# Patient Record
Sex: MALE | Race: WHITE | NOT HISPANIC OR LATINO | Employment: FULL TIME | URBAN - NONMETROPOLITAN AREA
[De-identification: names, ages, dates, MRNs, and addresses within clinical notes are randomized per-mention and may not be internally consistent; named-entity substitution may affect disease eponyms.]

---

## 2024-09-06 ENCOUNTER — HOSPITAL ENCOUNTER (EMERGENCY)
Facility: OTHER | Age: 49
Discharge: HOME | End: 2024-09-06
Attending: FAMILY MEDICINE | Admitting: FAMILY MEDICINE
Payer: OTHER GOVERNMENT

## 2024-09-06 VITALS
WEIGHT: 197 LBS | TEMPERATURE: 96.8 F | DIASTOLIC BLOOD PRESSURE: 97 MMHG | OXYGEN SATURATION: 98 % | RESPIRATION RATE: 16 BRPM | BODY MASS INDEX: 28.2 KG/M2 | HEIGHT: 70 IN | HEART RATE: 103 BPM | SYSTOLIC BLOOD PRESSURE: 148 MMHG

## 2024-09-06 DIAGNOSIS — S61.215A LACERATION OF LEFT RING FINGER WITHOUT FOREIGN BODY WITHOUT DAMAGE TO NAIL, INITIAL ENCOUNTER: ICD-10-CM

## 2024-09-06 PROCEDURE — 12002 RPR S/N/AX/GEN/TRNK2.6-7.5CM: CPT | Performed by: FAMILY MEDICINE

## 2024-09-06 PROCEDURE — 99282 EMERGENCY DEPT VISIT SF MDM: CPT | Performed by: FAMILY MEDICINE

## 2024-09-06 PROCEDURE — 99207 PR NO CHARGE LOS: CPT | Performed by: FAMILY MEDICINE

## 2024-09-06 RX ORDER — LIDOCAINE HYDROCHLORIDE 10 MG/ML
5 INJECTION, SOLUTION INFILTRATION; PERINEURAL ONCE
Status: DISCONTINUED | OUTPATIENT
Start: 2024-09-06 | End: 2024-09-06 | Stop reason: HOSPADM

## 2024-09-06 ASSESSMENT — COLUMBIA-SUICIDE SEVERITY RATING SCALE - C-SSRS
1. IN THE PAST MONTH, HAVE YOU WISHED YOU WERE DEAD OR WISHED YOU COULD GO TO SLEEP AND NOT WAKE UP?: NO
6. HAVE YOU EVER DONE ANYTHING, STARTED TO DO ANYTHING, OR PREPARED TO DO ANYTHING TO END YOUR LIFE?: NO
2. HAVE YOU ACTUALLY HAD ANY THOUGHTS OF KILLING YOURSELF IN THE PAST MONTH?: NO

## 2024-09-06 ASSESSMENT — ACTIVITIES OF DAILY LIVING (ADL): ADLS_ACUITY_SCORE: 35

## 2024-09-06 ASSESSMENT — ENCOUNTER SYMPTOMS: WOUND: 1

## 2024-09-06 NOTE — DISCHARGE INSTRUCTIONS
We used sutures to close this wound today.  Here are care instructions for you:    1. Please do not use any topical antibiotic on this. The skin has a balance of bacteria and yeast that is neccesary for skin health and healing, and we do not want to affect this. Please use vaseline and a band-aid on the repair to keep it soft as this heals.    2. Keep the area clean and dry and leave the splint in place for the next 24 hours.  After that it is okay to get the site wet from showering or bathing.  I recommend that you avoid swimming or using a hot tub until the sutures are out.    3. The area around the sutures may start to get red, and that is not a sign of infection in most cases.  The skin is reacting to the sutures and the redness will go away once the sutures are taken out.    4. Plan to return to get the sutures out in 7 days.  You can have this done in a local clinic or you can do this at home.    Skin takes a full twelve months to remodel, but usually after about three months you can see what you have for life.  Please treat the wound gently and keep it covered from the sun as it heals.    Tetanus status: up to date      Thank you for choosing our Emergency Department for your care.     You may receive a phone call or letter for a survey about your care in our ED.  Please complete this as this is how we improve care for our patients.     If you have any questions after leaving the ED you can call or text me on my cell phone at [phone].  I am not on call so if I do not answer my phone please leave a message- I will get back to you.  If you are not doing well please return to the ED.     Sincerely,    Dr Ayan Buchanan M.D.  Medical Director  [facility] Emergency Department

## 2024-09-06 NOTE — ED PROVIDER NOTES
"  History     Chief Complaint   Patient presents with    Laceration     The history is provided by the patient and medical records.     Mika Elizondo is a 49 year old male here with a cut to his left ring finger. He was putting a log back on the log splitter and his hand got caught between the log and the splitting blade. He has a cut on the dorsum of the left ring finger but the finger still works normally. This occurred at 6 PM.    Last tetanus was May 2023.    Allergies:  No Known Allergies    Problem List:    There are no problems to display for this patient.       Past Medical History:    History reviewed. No pertinent past medical history.    Past Surgical History:    History reviewed. No pertinent surgical history.    Family History:    History reviewed. No pertinent family history.    Social History:  Marital Status:    Social History     Tobacco Use    Smoking status: Never    Smokeless tobacco: Never   Substance Use Topics    Alcohol use: Yes     Comment: 2 beers/night    Drug use: Never        Medications:    No current outpatient medications on file.    Review of Systems   Skin:  Positive for wound.   All other systems reviewed and are negative.      Physical Exam   BP: (!) 148/97  Pulse: 103  Temp: 96.8  F (36  C)  Resp: 16  Height: 177.8 cm (5' 10\")  Weight: 89.4 kg (197 lb)  SpO2: 98 %      Physical Exam  Vitals and nursing note reviewed.   Constitutional:       Appearance: Normal appearance.   Musculoskeletal:      Comments: Normal NVM function of the left hand, specifically the left ring finger.   Skin:     Comments: He has a 4 cm laceration on the dorsum of the left ring finger.   Neurological:      Mental Status: He is alert.         Medications   lidocaine 1 % injection 5 mL (has no administration in time range)     The laceration was measured to be 4 cm. in length.  For analgesia 3 mL of 1 % Lidocaine was used. The wound was irrigated with tap water. I inspected for foreign body and for " "tendon or vascular damage, and there is none.  Exam of NVM function prior to repair shows normal function. The wound edges appear even and intact, and did not need revision. The wound was closed with 4-0 Ethilon. Exam of the NVM function after the repair showed normal function.     The patient tolerated the procedure well.    These sutures need to come out in 7 days.  We discussed oral antibiotics and I recommend none.  Tetanus status: up to date      Assessments & Plan (with Medical Decision Making)  Mika Elizondo is a 49 year old male here with a cut to his left ring finger. He was putting a log back on the log splitter and his hand got caught between the log and the splitting blade. He has a cut on the dorsum of the left ring finger but the finger still works normally. This occurred at 6 PM.  Last tetanus was May 2023.  VS in the ED BP (!) 148/97   Pulse 103   Temp 96.8  F (36  C) (Tympanic)   Resp 16   Ht 1.778 m (5' 10\")   Wt 89.4 kg (197 lb)   SpO2 98%   BMI 28.27 kg/m    Exam shows 4 cm laceration, repaired as above.  Home with a splint for 24 hours.     I have reviewed the nursing notes.    I have reviewed the findings, diagnosis, plan and need for follow up with the patient.  Medical Decision Making  The patient's presentation was of low complexity (an acute and uncomplicated illness or injury).    The patient's evaluation involved:  history and exam without other MDM data elements    The patient's management necessitated moderate risk (a decision regarding minor procedure (laceration repair) with risk factors of none).    Final diagnoses:   Laceration of left ring finger without foreign body without damage to nail, initial encounter       9/6/2024   [facility] AND [facility], Bradly Watts MD  09/06/24 1856    "

## 2024-09-06 NOTE — ED TRIAGE NOTES
Patient has a laceration to his left ring finger.  He was chopping wood when a piece of wood fell pinching his finger against the splitting wedge.  No active bleeding but laceration is 2-3cm and edges are not approximated.  Unsure if tetanus is up to date.  Able to move finger, denies any numbness or tingling.     Triage Assessment (Adult)       Row Name 09/06/24 3052          Triage Assessment    Airway WDL WDL        Respiratory WDL    Respiratory WDL WDL        Skin Circulation/Temperature WDL    Skin Circulation/Temperature WDL WDL;X        Cardiac WDL    Cardiac WDL WDL        Peripheral/Neurovascular WDL    Peripheral Neurovascular WDL WDL        Cognitive/Neuro/Behavioral WDL    Cognitive/Neuro/Behavioral WDL WDL

## (undated) RX ORDER — LIDOCAINE HYDROCHLORIDE 10 MG/ML
INJECTION, SOLUTION EPIDURAL; INFILTRATION; INTRACAUDAL; PERINEURAL
Status: DISPENSED
Start: 2024-09-06